# Patient Record
Sex: FEMALE | Race: OTHER | URBAN - METROPOLITAN AREA
[De-identification: names, ages, dates, MRNs, and addresses within clinical notes are randomized per-mention and may not be internally consistent; named-entity substitution may affect disease eponyms.]

---

## 2021-12-06 ENCOUNTER — NEW PATIENT (OUTPATIENT)
Dept: URBAN - METROPOLITAN AREA CLINIC 4 | Facility: CLINIC | Age: 51
End: 2021-12-06

## 2021-12-06 DIAGNOSIS — H52.203: ICD-10-CM

## 2021-12-06 DIAGNOSIS — H10.13: ICD-10-CM

## 2021-12-06 DIAGNOSIS — E11.9: ICD-10-CM

## 2021-12-06 DIAGNOSIS — H04.123: ICD-10-CM

## 2021-12-06 DIAGNOSIS — H53.2: ICD-10-CM

## 2021-12-06 PROCEDURE — 92250 FUNDUS PHOTOGRAPHY W/I&R: CPT

## 2021-12-06 PROCEDURE — 92015 DETERMINE REFRACTIVE STATE: CPT

## 2021-12-06 PROCEDURE — 99244 OFF/OP CNSLTJ NEW/EST MOD 40: CPT

## 2021-12-06 NOTE — PATIENT DISCUSSION
PATIENT IS NOT SEEING TRUE DOUBLE VISION.  NEEDS A NEW GLASSES PRESCRIPTION AND NEEDS READING PART ADJUSTED IN THE GLASSES SO SHE DOES NOT HAVE TO LIFT THEM UP ON HER FACE TO SEE.

## 2021-12-08 ASSESSMENT — VISUAL ACUITY
OD_GLARE: 20/100
OS_GLARE: 20/40
OD_SC: CF 2FT
OS_SC: CF 2FT

## 2021-12-08 ASSESSMENT — TONOMETRY
OD_IOP_MMHG: 16
OS_IOP_MMHG: 13